# Patient Record
Sex: FEMALE | Race: OTHER
[De-identification: names, ages, dates, MRNs, and addresses within clinical notes are randomized per-mention and may not be internally consistent; named-entity substitution may affect disease eponyms.]

---

## 2017-02-06 ENCOUNTER — HOSPITAL ENCOUNTER (EMERGENCY)
Dept: HOSPITAL 62 - ER | Age: 22
Discharge: HOME | End: 2017-02-06
Payer: MEDICAID

## 2017-02-06 VITALS — DIASTOLIC BLOOD PRESSURE: 60 MMHG | SYSTOLIC BLOOD PRESSURE: 110 MMHG

## 2017-02-06 DIAGNOSIS — R19.7: ICD-10-CM

## 2017-02-06 DIAGNOSIS — R10.9: ICD-10-CM

## 2017-02-06 DIAGNOSIS — R11.10: Primary | ICD-10-CM

## 2017-02-06 DIAGNOSIS — R50.9: ICD-10-CM

## 2017-02-06 LAB
ALBUMIN SERPL-MCNC: 4.1 G/DL (ref 3.5–5)
ALP SERPL-CCNC: 46 U/L (ref 38–126)
ALT SERPL-CCNC: 21 U/L (ref 9–52)
ANION GAP SERPL CALC-SCNC: 11 MMOL/L (ref 5–19)
APPEARANCE UR: (no result)
AST SERPL-CCNC: 17 U/L (ref 14–36)
BASOPHILS # BLD AUTO: 0 10^3/UL (ref 0–0.2)
BASOPHILS NFR BLD AUTO: 0.2 % (ref 0–2)
BILIRUB DIRECT SERPL-MCNC: 0 MG/DL (ref 0–0.3)
BILIRUB SERPL-MCNC: 1.1 MG/DL (ref 0.2–1.3)
BILIRUB UR QL STRIP: NEGATIVE
BUN SERPL-MCNC: 10 MG/DL (ref 7–20)
CALCIUM: 9.2 MG/DL (ref 8.4–10.2)
CHLORIDE SERPL-SCNC: 105 MMOL/L (ref 98–107)
CO2 SERPL-SCNC: 27 MMOL/L (ref 22–30)
CREAT SERPL-MCNC: 0.56 MG/DL (ref 0.52–1.25)
EOSINOPHIL # BLD AUTO: 0.2 10^3/UL (ref 0–0.6)
EOSINOPHIL NFR BLD AUTO: 1.2 % (ref 0–6)
ERYTHROCYTE [DISTWIDTH] IN BLOOD BY AUTOMATED COUNT: 13 % (ref 11.5–14)
GLUCOSE SERPL-MCNC: 87 MG/DL (ref 75–110)
GLUCOSE UR STRIP-MCNC: NEGATIVE MG/DL
HCT VFR BLD CALC: 40.8 % (ref 36–47)
HGB BLD-MCNC: 13.9 G/DL (ref 12–15.5)
HGB HCT DIFFERENCE: 0.9
KETONES UR STRIP-MCNC: NEGATIVE MG/DL
LIPASE SERPL-CCNC: 128.8 U/L (ref 23–300)
LYMPHOCYTES # BLD AUTO: 1.2 10^3/UL (ref 0.5–4.7)
LYMPHOCYTES NFR BLD AUTO: 8.2 % (ref 13–45)
MCH RBC QN AUTO: 30.5 PG (ref 27–33.4)
MCHC RBC AUTO-ENTMCNC: 34.1 G/DL (ref 32–36)
MCV RBC AUTO: 89 FL (ref 80–97)
MONOCYTES # BLD AUTO: 1 10^3/UL (ref 0.1–1.4)
MONOCYTES NFR BLD AUTO: 6.8 % (ref 3–13)
NEUTROPHILS # BLD AUTO: 12.4 10^3/UL (ref 1.7–8.2)
NEUTS SEG NFR BLD AUTO: 83.6 % (ref 42–78)
NITRITE UR QL STRIP: NEGATIVE
PH UR STRIP: 6 [PH] (ref 5–9)
POTASSIUM SERPL-SCNC: 3.9 MMOL/L (ref 3.6–5)
PROT SERPL-MCNC: 7.3 G/DL (ref 6.3–8.2)
PROT UR STRIP-MCNC: 30 MG/DL
RBC # BLD AUTO: 4.57 10^6/UL (ref 3.72–5.28)
SODIUM SERPL-SCNC: 142.5 MMOL/L (ref 137–145)
SP GR UR STRIP: 1.03
UROBILINOGEN UR-MCNC: NEGATIVE MG/DL (ref ?–2)
WBC # BLD AUTO: 14.8 10^3/UL (ref 4–10.5)

## 2017-02-06 PROCEDURE — 83690 ASSAY OF LIPASE: CPT

## 2017-02-06 PROCEDURE — 81001 URINALYSIS AUTO W/SCOPE: CPT

## 2017-02-06 PROCEDURE — 85025 COMPLETE CBC W/AUTO DIFF WBC: CPT

## 2017-02-06 PROCEDURE — 36415 COLL VENOUS BLD VENIPUNCTURE: CPT

## 2017-02-06 PROCEDURE — S0119 ONDANSETRON 4 MG: HCPCS

## 2017-02-06 PROCEDURE — 99283 EMERGENCY DEPT VISIT LOW MDM: CPT

## 2017-02-06 PROCEDURE — 84703 CHORIONIC GONADOTROPIN ASSAY: CPT

## 2017-02-06 PROCEDURE — 80053 COMPREHEN METABOLIC PANEL: CPT

## 2017-02-06 NOTE — ER DOCUMENT REPORT
ED GI/





- General


Chief Complaint: Abdominal Pain


Stated Complaint: STOMACH PAIN


Mode of Arrival: Ambulatory


Information source: Patient


Notes: 


21-year-old female with past medical history of cholecystectomy in July 2015 

secondary to gallstones who presents today with the onset around 4 days ago of 

some intermittent left upper quadrant "sharp" abdominal pain relieved with 

vomiting.  She also states 4-5 bouts of nonbloody diarrhea daily.  She denies 

any recent travel or antibiotics.  She does state a temperature of around 102 

at maximum over the last 4 days.  She denies any fever the last 24 hours.  She 

denies any dysuria or flank pain.





She denies any radiation, aggravating relieving factors and she denies any 

other abdominal surgery in the past.  She denies any and all lower abdominal 

pain.  She denies missing any menstrual periods.


TRAVEL OUTSIDE OF THE [place] IN LAST 30 DAYS: No





- HPI


Patient complains to provider of: Abdominal pain


Onset: Other - See above


Timing/Duration: Gone


Quality of pain: Other - See above


Severity at maximum: Mild


Severity in ED: None


Pain Level: 2


Location: Other - See above


Vaginal bleeding (Compared to normal period): Spotting


Menstrual period history: denies: Abnormal


Sexual history: Active


Associated symptoms: Other - See above


Exacerbated by: Denies


Relieved by: Denies


Similar symptoms previously: Yes





- Related Data


Allergies/Adverse Reactions: 


 





No Known Allergies Allergy (Verified 02/06/17 10:06)


 











Past Medical History





- General


Information source: Patient





- Social History


Smoking Status: Never Smoker


Chew tobacco use (# tins/day): No


Frequency of alcohol use: Social


Drug Abuse: None


Family History: Reviewed & Not Pertinent


Patient has suicidal ideation: No


Patient has homicidal ideation: No


Renal/ Medical History: Denies: Hx Peritoneal Dialysis





- Immunizations


Hx Diphtheria, Pertussis, Tetanus Vaccination: Yes





Review of Systems





- Review of Systems


Constitutional: Fever


EENT: denies: Eye discharge, Nose discharge


Respiratory: denies: Short of breath


Gastrointestinal: Vomiting


Genitourinary: denies: Dysuria


Musculoskeletal: denies: Leg swelling


Skin: Other - no hives.  denies: Rash


Neurological/Psychological: Other - no slurred speech


-: Yes All other systems reviewed and negative





Physical Exam





- Vital signs


Vitals: 





 











Temp Pulse Resp BP Pulse Ox


 


 98.0 F   83   16   112/62   98 


 


 02/06/17 09:56  02/06/17 09:56  02/06/17 09:56  02/06/17 09:56  02/06/17 09:56











Notes: 


Reviewed vital signs and nursing note as charted by RN.





CONSTITUTIONAL: Alert and oriented and responds appropriately to questions. Well

-appearing; well-nourished





HEAD: Normocephalic; atraumatic





EYES: Sclerae non-icteric





CARD: Regular rate and rhythm; no murmurs, no clicks, no rubs, no gallops; 

symmetric distal pulses





RESP: Normal chest excursion without splinting or tachypnea; breath sounds 

clear and equal bilaterally; no wheezes, no rhonchi, no rales





ABD/GI: Normal bowel sounds; non-distended; soft, non-tender to deep palpation 

of all 4 quadrants of the abdomen currently.  Patient denies any pain at this 

time.





BACK: The back appears normal and is non-tender to palpation, there is no CVA 

tenderness





EXT: Normal ROM in all joints; non-tender to palpation; no cyanosis, no 

effusions, no edema





SKIN: Normal color for age and race; warm; dry; good turgor; capillary refill < 

2 seconds; no acute lesions noted





NEURO: Moves all extremities equally; Motor and sensory function intact





PSYCH: The patient's mood and manner are appropriate. Grooming and personal 

hygiene are appropriate.





Course





- Re-evaluation


Re-evalutation: 


Given the history and physical examination, I will order basic labs, abdominal 

labs, urinalysis, and pregnancy test.  Patient denies any and all pain at this 

time.  There is no tenderness to palpation of the abdomen at this time.  

Patient is sitting up smiling in no acute distress.  She is currently started 

her menstrual period around 3 days ago.





02/06/17 11:22


Labs as recorded.  Minimally elevated white count.  Possible urinary tract 

infection mixed with blood.  





Urine culture has been sent.  Normal liver panel and lipase.  No lower 

abdominal tenderness objectively or subjectively.  I do not believe a pelvic 

examination is necessary at this time.  I will start the patient on a short 

course of ciprofloxacin with strict return precautions and urine culture 

pending.  Patient will be discharged with Zofran after a successful by mouth 

challenge with strict return precautions.





- Vital Signs


Vital signs: 





 











Temp Pulse Resp BP Pulse Ox


 


 98.0 F   83   16   112/62   98 


 


 02/06/17 09:56  02/06/17 09:56  02/06/17 09:56  02/06/17 09:56  02/06/17 09:56














- Laboratory


Result Diagrams: 


 02/06/17 10:30





 02/06/17 10:30


Laboratory results interpreted by me: 





 











  02/06/17 02/06/17





  10:30 10:30


 


WBC  14.8 H 


 


Seg Neutrophils %  83.6 H 


 


Lymphocytes %  8.2 L 


 


Absolute Neutrophils  12.4 H 


 


Urine Protein   30 H


 


Urine Blood   LARGE H


 


Ur Leukocyte Esterase   MODERATE H














Discharge





- Discharge


Clinical Impression: 


 Vomiting and diarrhea





UTI (urinary tract infection)


Qualifiers:


 Urinary tract infection type: site unspecified Hematuria presence: with 

hematuria Qualified Code(s): N39.0 - Urinary tract infection, site not specified

; R31.9 - Hematuria, unspecified





Condition: Good


Disposition: HOME, SELF-CARE


Additional Instructions: 


Come back immediately for any return of pain, change in quality or location of 

pain, persistent vomiting or diarrhea, or any other acute problems.  Please 

take the antibiotics as prescribed, follow-up with the urine culture, and follow

-up with your primary care physician.


Prescriptions: 


Ciprofloxacin HCl [Cipro 500 mg Tablet] 500 mg PO BID #10 tablet


Ondansetron HCl [Zofran 4 mg Tablet] 1 - 2 tab PO Q4H PRN #10 tablet


 PRN Reason:

## 2017-02-06 NOTE — ER DOCUMENT REPORT
ED Medical Screen (RME)





- General


Stated Complaint: STOMACH PAIN


Mode of Arrival: Ambulatory


Information source: Patient


Notes: 


Patient complains of left upper quadrant abdominal pain off and on for the past 

4 days with nausea and vomiting.  She does report some diarrhea.  Patient does 

report fever of 102.





hx: Cholecystectomy





I have greeted and performed a rapid initial assessment of this patient.  A 

comprehensive ED assessment and evaluation of the patient, analysis of test 

results and completion of the medical decision making process will be conducted 

by additional ED providers.


TRAVEL OUTSIDE OF THE [place] IN LAST 30 DAYS: No





- Related Data


Allergies/Adverse Reactions: 


 





No Known Allergies Allergy (Verified 02/06/17 10:06)


 











Physical Exam





- Vital signs


Vitals: 





 











Temp Pulse Resp BP Pulse Ox


 


 98.0 F   83   16   112/62   98 


 


 02/06/17 09:56  02/06/17 09:56  02/06/17 09:56  02/06/17 09:56  02/06/17 09:56














- Abdominal


Tenderness: Tender - Left upper quadrant





Course





- Vital Signs


Vital signs: 





 











Temp Pulse Resp BP Pulse Ox


 


 98.0 F   83   16   112/62   98 


 


 02/06/17 09:56  02/06/17 09:56  02/06/17 09:56  02/06/17 09:56  02/06/17 09:56

## 2017-09-28 ENCOUNTER — HOSPITAL ENCOUNTER (EMERGENCY)
Dept: HOSPITAL 62 - ER | Age: 22
Discharge: HOME | End: 2017-09-28
Payer: MEDICAID

## 2017-09-28 VITALS — DIASTOLIC BLOOD PRESSURE: 80 MMHG | SYSTOLIC BLOOD PRESSURE: 126 MMHG

## 2017-09-28 DIAGNOSIS — R10.9: ICD-10-CM

## 2017-09-28 DIAGNOSIS — R10.13: Primary | ICD-10-CM

## 2017-09-28 DIAGNOSIS — R42: ICD-10-CM

## 2017-09-28 LAB
ALBUMIN SERPL-MCNC: 4.4 G/DL (ref 3.5–5)
ALP SERPL-CCNC: 43 U/L (ref 38–126)
ALT SERPL-CCNC: 24 U/L (ref 9–52)
ANION GAP SERPL CALC-SCNC: 11 MMOL/L (ref 5–19)
APPEARANCE UR: CLEAR
AST SERPL-CCNC: 21 U/L (ref 14–36)
BASOPHILS # BLD AUTO: 0 10^3/UL (ref 0–0.2)
BASOPHILS NFR BLD AUTO: 0.3 % (ref 0–2)
BILIRUB DIRECT SERPL-MCNC: 0.2 MG/DL (ref 0–0.4)
BILIRUB SERPL-MCNC: 0.7 MG/DL (ref 0.2–1.3)
BILIRUB UR QL STRIP: NEGATIVE
BUN SERPL-MCNC: 7 MG/DL (ref 7–20)
CALCIUM: 9.9 MG/DL (ref 8.4–10.2)
CHLORIDE SERPL-SCNC: 103 MMOL/L (ref 98–107)
CO2 SERPL-SCNC: 28 MMOL/L (ref 22–30)
CREAT SERPL-MCNC: 0.49 MG/DL (ref 0.52–1.25)
EOSINOPHIL # BLD AUTO: 0.2 10^3/UL (ref 0–0.6)
EOSINOPHIL NFR BLD AUTO: 2 % (ref 0–6)
ERYTHROCYTE [DISTWIDTH] IN BLOOD BY AUTOMATED COUNT: 13.1 % (ref 11.5–14)
GLUCOSE SERPL-MCNC: 93 MG/DL (ref 75–110)
GLUCOSE UR STRIP-MCNC: NEGATIVE MG/DL
HCT VFR BLD CALC: 40.8 % (ref 36–47)
HGB BLD-MCNC: 14 G/DL (ref 12–15.5)
HGB HCT DIFFERENCE: 1.2
KETONES UR STRIP-MCNC: NEGATIVE MG/DL
LIPASE SERPL-CCNC: 164.4 U/L (ref 23–300)
LYMPHOCYTES # BLD AUTO: 2.3 10^3/UL (ref 0.5–4.7)
LYMPHOCYTES NFR BLD AUTO: 18.3 % (ref 13–45)
MCH RBC QN AUTO: 30.7 PG (ref 27–33.4)
MCHC RBC AUTO-ENTMCNC: 34.2 G/DL (ref 32–36)
MCV RBC AUTO: 90 FL (ref 80–97)
MONOCYTES # BLD AUTO: 0.7 10^3/UL (ref 0.1–1.4)
MONOCYTES NFR BLD AUTO: 5.6 % (ref 3–13)
NEUTROPHILS # BLD AUTO: 9.3 10^3/UL (ref 1.7–8.2)
NEUTS SEG NFR BLD AUTO: 73.8 % (ref 42–78)
NITRITE UR QL STRIP: NEGATIVE
PH UR STRIP: 5 [PH] (ref 5–9)
POTASSIUM SERPL-SCNC: 4.1 MMOL/L (ref 3.6–5)
PROT SERPL-MCNC: 7.3 G/DL (ref 6.3–8.2)
PROT UR STRIP-MCNC: NEGATIVE MG/DL
RBC # BLD AUTO: 4.55 10^6/UL (ref 3.72–5.28)
SODIUM SERPL-SCNC: 141.5 MMOL/L (ref 137–145)
SP GR UR STRIP: 1.02
UROBILINOGEN UR-MCNC: 2 MG/DL (ref ?–2)
WBC # BLD AUTO: 12.7 10^3/UL (ref 4–10.5)

## 2017-09-28 PROCEDURE — 80053 COMPREHEN METABOLIC PANEL: CPT

## 2017-09-28 PROCEDURE — 81001 URINALYSIS AUTO W/SCOPE: CPT

## 2017-09-28 PROCEDURE — 82272 OCCULT BLD FECES 1-3 TESTS: CPT

## 2017-09-28 PROCEDURE — 71020: CPT

## 2017-09-28 PROCEDURE — 85025 COMPLETE CBC W/AUTO DIFF WBC: CPT

## 2017-09-28 PROCEDURE — 99284 EMERGENCY DEPT VISIT MOD MDM: CPT

## 2017-09-28 PROCEDURE — 81025 URINE PREGNANCY TEST: CPT

## 2017-09-28 PROCEDURE — 36415 COLL VENOUS BLD VENIPUNCTURE: CPT

## 2017-09-28 PROCEDURE — 83690 ASSAY OF LIPASE: CPT

## 2017-09-28 NOTE — ER DOCUMENT REPORT
ED Medical Screen (RME)





- General


Chief Complaint: Abdominal Pain


Stated Complaint: ABDOMINAL PAIN, DIZZY


Time Seen by Provider: 09/28/17 17:46


Notes: 





Patient states that since yesterday she has had intermittent left upper 

quadrant abdominal pain.  She states previously when she had this pain she had 

a "ruptured gallbladder".  That was approximately 2 years ago.  She states she 

has been able to eat normally but has had some nausea.  She is also had 

diarrhea.  She denies any vaginal symptoms.


TRAVEL OUTSIDE OF THE [place] IN LAST 30 DAYS: No





- Related Data


Allergies/Adverse Reactions: 


 





No Known Allergies Allergy (Verified 09/28/17 16:46)


 











Past Medical History





- Social History


Chew tobacco use (# tins/day): No


Frequency of alcohol use: None


Drug Abuse: None


Renal/ Medical History: Denies: Hx Peritoneal Dialysis


Past Surgical History: Reports: Hx Cholecystectomy





- Immunizations


Hx Diphtheria, Pertussis, Tetanus Vaccination: Yes





Physical Exam





- Vital signs


Vitals: 





 











Temp Pulse Resp BP Pulse Ox


 


 98.7 F   72   14   126/80 H  100 


 


 09/28/17 16:46  09/28/17 16:46  09/28/17 16:46  09/28/17 16:46  09/28/17 16:46














Course





- Vital Signs


Vital signs: 





 











Temp Pulse Resp BP Pulse Ox


 


 98.7 F   72   14   126/80 H  100 


 


 09/28/17 16:46  09/28/17 16:46  09/28/17 16:46  09/28/17 16:46  09/28/17 16:46

## 2017-09-28 NOTE — ER DOCUMENT REPORT
ED General





- General


Chief Complaint: Abdominal Pain


Stated Complaint: ABDOMINAL PAIN, DIZZY


Time Seen by Provider: 09/28/17 17:46


TRAVEL OUTSIDE OF THE [place] IN LAST 30 DAYS: No





- HPI


Notes: 





Patient is a 22-year-old female who presents the ED complaining of epigastric 

pain 1 day.  Patient states she does have an occasional feeling of nausea 

without any vomiting.  Patient states that on occasion she will also have loose 

stool without any hematochezia or melena.  Patient states that the pain does 

not radiate.  Patient states that she had pain similar to this in the past, and 

was found to have a rupturing gallbladder which she has since had surgically 

removed.  Patient states that food intake does improve her discomfort.  She is 

not aware of anything that worsens it.  The pain is intermittent.  Pt has been 

having normal urinations.  She has not been taking any over-the-counter meds 

for her symptoms.  Denies any drug allergies.  Denies any other significant 

past medical history.  Denies any smoking or drug use.  Denies any headache, 

fever, URI, sore throat, chest pain, palpitations, syncope, cough, shortness of 

breath, wheeze, dyspnea, vomiting/constipation, urinary retention, dysuria, 

hematuria, back pain, vaginal discharge/odor/bleeding, or rash.





- Related Data


Allergies/Adverse Reactions: 


 





No Known Allergies Allergy (Verified 09/28/17 16:46)


 











Past Medical History





- Social History


Smoking Status: Never Smoker


Chew tobacco use (# tins/day): No


Frequency of alcohol use: None


Drug Abuse: None


Family History: Reviewed & Not Pertinent


Renal/ Medical History: Denies: Hx Peritoneal Dialysis


Past Surgical History: Reports: Hx Cholecystectomy





- Immunizations


Hx Diphtheria, Pertussis, Tetanus Vaccination: Yes





Review of Systems





- Review of Systems


Notes: 





  REVIEW OF SYSTEMS:


CONSTITUTIONAL :  Denies fever,  chills, or sweats.  Denies recent illness.


EENT:   Denies eye, ear, throat, or mouth pain or symptoms.  Denies nasal or 

sinus congestion or discharge.  Denies throat, tongue, or mouth swelling or 

difficulty swallowing.


CARDIOVASCULAR:  Denies chest pain.  Denies palpitations or racing or irregular 

heart beat.  Denies ankle edema.


RESPIRATORY:  Denies cough, cold, or chest congestion.  Denies shortness of 

breath, difficulty breathing, or wheezing.


GASTROINTESTINAL: see hpi


GENITOURINARY:  Denies difficulty urinating, painful urination, burning, 

frequency, blood in urine, or discharge.


FEMALE  GENITOURINARY:  Denies vaginal bleeding, heavy or abnormal periods, 

irregular periods.  Denies vaginal discharge or odor. 


MUSCULOSKELETAL:  Denies back or neck pain or stiffness.  Denies joint pain or 

swelling.


SKIN:   Denies rash, lesions or sores.


NEUROLOGICAL:  Denies confusion or altered mental status.  Denies passing out 

or loss of consciousness.  Denies dizziness or lightheadedness.  Denies 

headache.  Denies weakness or paralysis or loss of use of either side.  Denies 

problems with gait or speech.  Denies sensory loss, numbness, or tingling.  





ALL OTHER SYSTEMS REVIEWED AND NEGATIVE.








Dictation was performed using Dragon voice recognition software





Physical Exam





- Vital signs


Vitals: 


 











Temp Pulse Resp BP Pulse Ox


 


 98.7 F   72   14   126/80 H  100 


 


 09/28/17 16:46  09/28/17 16:46  09/28/17 16:46  09/28/17 16:46  09/28/17 16:46











Notes: 





PHYSICAL EXAMINATION:





GENERAL: Well-appearing, well-nourished and in no acute distress.





HEAD: Atraumatic, normocephalic.





EYES: Pupils equal round and reactive to light, extraocular movements intact, 

sclera anicteric, conjunctiva are normal.





ENT: EAC clear b/l.  TM's intact b/l without erythema, fluid, or perforation.  

Nares patent and without discharge.  oropharynx clear without exudates.  No 

tonsilar hypertrophy or erythema.  Moist mucous membranes.  No sinus tenderness.





NECK: Normal range of motion, supple without lymphadenopathy





LUNGS: Breath sounds clear to auscultation bilaterally and equal.  No wheezes 

rales or rhonchi.





HEART: Regular rate and rhythm without murmurs, rubs, gallops.





ABDOMEN: Soft, nondistended abdomen.  No masses appreciated.  Normal bowel 

sounds present.  No CVA tenderness bilaterally.  + tenderness to the 

epigastrum.  Psoas/rosving negative.  + mild guarding to the epigastrum.





Rectal:  Guiac obtained.  No sandeep blood. accompanied by female PCT.





Musculoskeletal: FROM to passive/active. Strength 5+/5. 





Extremities:  No cyanosis, clubbing, or edema b/l.  Peripheral pulses 2+.  

Capillary refill less than 3 seconds.





NEUROLOGICAL: Cranial nerves grossly intact.  Normal speech, normal gait.  

Normal sensory, motor exams 





PSYCH: Normal mood, normal affect.





SKIN: Warm, Dry, normal turgor, no rashes or lesions noted.





Course





- Re-evaluation


Re-evalutation: 





09/28/17 21:10


Patient is an afebrile, well-hydrated, 22-year-old female who presents the ED 

with epigastric pain, ?ulcer.   vitals are stable.  PE otherwise unremarkable.  

CBC, CMP, Lipase, UA, urine pregn unremarkable.  CXR unremarkable.  Guiac 

negative.  Reviewed with Dr. Clay.  No imaging warranted at this time.  GI 

cocktail given which did help dec her pain.  Low suspicion/risk for acute 

appendicitis, bowel obstruction, acute cholecystitis, acute cholangitis, 

perforated diverticulitis, incarcerated hernia, pancreatitis, perforated ulcer, 

peritonitis, sepsis, pelvic inflammatory disease, ectopic pregnancy, tubo-

ovarian abscess, ovarian torsion, or other systemic emergent condition at this 

time.  Patient is aware that her condition can change from initial presentation 

and she needs to monitor symptoms closely and seek medical attention if any 

acute changes.  I will send her home with Prilosec and carafate to take as 

directed.  Conservative measures otherwise for symptoms.  Recheck with GI in 3-

5 days.  Recheck with your PCM in 3-5 days.  Return to the ED with any worsening

/concerning symptoms otherwise as reviewed in discharge.  Patient is in 

agreement.








- Vital Signs


Vital signs: 


 











Temp Pulse Resp BP Pulse Ox


 


 98.7 F   72   14   126/80 H  100 


 


 09/28/17 16:46  09/28/17 16:46  09/28/17 16:46  09/28/17 16:46  09/28/17 16:46














- Laboratory


Result Diagrams: 


 09/28/17 18:00





 09/28/17 18:00


Laboratory results interpreted by me: 


 











  09/28/17 09/28/17 09/28/17





  18:00 18:00 18:00


 


WBC  12.7 H  


 


Absolute Neutrophils  9.3 H  


 


Creatinine   0.49 L 


 


Urine Urobilinogen    2.0 H














Discharge





- Discharge


Clinical Impression: 


 Epigastric pain





Condition: Stable


Disposition: HOME, SELF-CARE


Instructions:  Abdominal Pain ([facility]), Family Physicians / Practices, Low-Fat 

Diet ([facility]), Prilosec (Acid Pump Inhibitor) ([facility]), Ulcer ([facility]), Follow-Up Care (

[facility])


Additional Instructions: 


Maintain adequate fluid and food intake


Take medications as directed 


Tylenol if needed


Monitor for any changes in your symptoms


Recheck with your PCM in 2-3 days


Schedule an appointment with gastroenterology


Return to the ED with any worsening symptoms and/or development of fever, 

headache, chest pain, palpitations, syncope, shortness of breath, trouble 

breathing, abdominal pain, n/v/d, blood in stool/urine, loss of control of bowel

/bladder, back pain, or other worsening symptoms that are concerning to you.


Prescriptions: 


Omeprazole 20 mg PO DAILY #30 tablet.


Sucralfate [Carafate] 1 gm PO QID PRN #420 ml


 PRN Reason: 


Forms:  Elevated Blood Pressure


Referrals: 


[facility] [Provider Group] - Follow up as needed


[facility] [Provider Group] - Follow up as needed


[facility] MD [ACTIVE STAFF] - Follow up as needed

## 2017-09-28 NOTE — RADIOLOGY REPORT (SQ)
EXAM DESCRIPTION:  CHEST PA/LAT



COMPLETED DATE/TIME:  9/28/2017 7:26 pm



REASON FOR STUDY:  epigastric pain



COMPARISON:  None.



EXAM PARAMETERS:  NUMBER OF VIEWS: two views

TECHNIQUE: Digital Frontal and Lateral radiographic views of the chest acquired.

RADIATION DOSE: NA

LIMITATIONS: none



FINDINGS:  LUNGS AND PLEURA: No opacities, masses or pneumothorax. No pleural effusion.

MEDIASTINUM AND HILAR STRUCTURES: No masses or contour abnormalities.

HEART AND VASCULAR STRUCTURES: Heart normal size.  No evidence for failure.

BONES: No acute findings.

HARDWARE: None in the chest.

OTHER: No other significant finding.



IMPRESSION:  NO SIGNIFICANT RADIOGRAPHIC FINDING IN THE CHEST.



TECHNICAL DOCUMENTATION:  JOB ID:  [id number]

 2011 [employer]- All Rights Reserved

## 2018-08-01 ENCOUNTER — HOSPITAL ENCOUNTER (EMERGENCY)
Dept: HOSPITAL 62 - ER | Age: 23
Discharge: HOME | End: 2018-08-01
Payer: MEDICAID

## 2018-08-01 VITALS — SYSTOLIC BLOOD PRESSURE: 118 MMHG | DIASTOLIC BLOOD PRESSURE: 63 MMHG

## 2018-08-01 DIAGNOSIS — Z3A.09: ICD-10-CM

## 2018-08-01 DIAGNOSIS — J40: ICD-10-CM

## 2018-08-01 DIAGNOSIS — R05: ICD-10-CM

## 2018-08-01 DIAGNOSIS — O26.851: ICD-10-CM

## 2018-08-01 DIAGNOSIS — O99.511: Primary | ICD-10-CM

## 2018-08-01 DIAGNOSIS — R10.2: ICD-10-CM

## 2018-08-01 DIAGNOSIS — O26.891: ICD-10-CM

## 2018-08-01 LAB
APPEARANCE UR: (no result)
APTT PPP: YELLOW S
BACTERIA (WET MOUNT): (no result)
BILIRUB UR QL STRIP: NEGATIVE
CHLAM PCR: NOT DETECTED
EPITHELIALS (WET MOUNT): (no result)
GLUCOSE UR STRIP-MCNC: NEGATIVE MG/DL
GON PCR: NOT DETECTED
KETONES UR STRIP-MCNC: NEGATIVE MG/DL
NITRITE UR QL STRIP: NEGATIVE
PH UR STRIP: 6 [PH] (ref 5–9)
PROT UR STRIP-MCNC: NEGATIVE MG/DL
RBCS (WET MOUNT): (no result)
SP GR UR STRIP: 1.02
T.VAGINALIS (WET MOUNT): (no result)
UROBILINOGEN UR-MCNC: 2 MG/DL (ref ?–2)
WBCS (WET MOUNT): (no result)
YEAST (WET MOUNT): (no result)

## 2018-08-01 PROCEDURE — 81001 URINALYSIS AUTO W/SCOPE: CPT

## 2018-08-01 PROCEDURE — 36415 COLL VENOUS BLD VENIPUNCTURE: CPT

## 2018-08-01 PROCEDURE — 71046 X-RAY EXAM CHEST 2 VIEWS: CPT

## 2018-08-01 PROCEDURE — 87591 N.GONORRHOEAE DNA AMP PROB: CPT

## 2018-08-01 PROCEDURE — 86900 BLOOD TYPING SEROLOGIC ABO: CPT

## 2018-08-01 PROCEDURE — 87210 SMEAR WET MOUNT SALINE/INK: CPT

## 2018-08-01 PROCEDURE — 76801 OB US < 14 WKS SINGLE FETUS: CPT

## 2018-08-01 PROCEDURE — 87491 CHLMYD TRACH DNA AMP PROBE: CPT

## 2018-08-01 PROCEDURE — 99284 EMERGENCY DEPT VISIT MOD MDM: CPT

## 2018-08-01 PROCEDURE — 86901 BLOOD TYPING SEROLOGIC RH(D): CPT

## 2018-08-01 NOTE — ER DOCUMENT REPORT
ED Medical Screen (RME)





- General


Chief Complaint: Cough


Stated Complaint: COUGH


Time Seen by Provider: 08/01/18 12:08


TRAVEL OUTSIDE OF THE [place] IN LAST 30 DAYS: No





- HPI


Patient complains to provider of: Cough and shortness of breath 3 weeks





- Related Data


Allergies/Adverse Reactions: 


 





No Known Allergies Allergy (Verified 09/28/17 16:46)


 











Past Medical History


Renal/ Medical History: Denies: Hx Peritoneal Dialysis


Past Surgical History: Reports: Hx Cholecystectomy





- Immunizations


Hx Diphtheria, Pertussis, Tetanus Vaccination: Yes





Review of Systems





- Review of Systems


Respiratory: Cough, Sputum


Female Genitourinary: See HPI - Patient with single episode of spotting





Physical Exam





- Vital signs


Vitals: 





 











Temp Pulse Resp BP Pulse Ox


 


 99.1 F   88   14   118/63   98 


 


 08/01/18 11:25  08/01/18 11:25  08/01/18 11:25  08/01/18 11:25  08/01/18 11:25














Course





- Re-evaluation


Re-evalutation: 





08/01/18 12:12


This 23-year-old female is 9 weeks pregnant currently receiving prenatal care 

has had 3 weeks of persistent cough with some productive sputum and 1 day of 

fevers.  She presented without any obvious distress and looks overall well.  

Given her symptoms we will plan for evaluation with two-view chest x-ray and 

appropriate abdominal shielding.


Spoke with the patient about the risks and benefits of chest x-ray and the dose 

of radiation for the fetus.


Patient will also require a transabdominal ultrasound for fetal well-being as 

she did have a single episode of spotting which she attributed to persistent 

coughing.


Patient to have chest x-ray as well as urinalysis.  Will move through the PIT 

for further evaluation.





- Vital Signs


Vital signs: 





 











Temp Pulse Resp BP Pulse Ox


 


 99.1 F   88   14   118/63   98 


 


 08/01/18 11:25  08/01/18 11:25  08/01/18 11:25  08/01/18 11:25  08/01/18 11:25

## 2018-08-01 NOTE — ER DOCUMENT REPORT
ED Respiratory Problem





- General


Chief Complaint: Cough


Stated Complaint: COUGH


Time Seen by Provider: 08/01/18 12:08


TRAVEL OUTSIDE OF THE [place] IN LAST 30 DAYS: No





- Related Data


Allergies/Adverse Reactions: 


 





No Known Allergies Allergy (Verified 09/28/17 16:46)


 











Past Medical History





- Social History


Smoking Status: Never Smoker


Family History: Reviewed & Not Pertinent


Patient has suicidal ideation: No


Patient has homicidal ideation: No


Renal/ Medical History: Denies: Hx Peritoneal Dialysis


Past Surgical History: Reports: Hx Cholecystectomy





- Immunizations


Hx Diphtheria, Pertussis, Tetanus Vaccination: Yes





Physical Exam





- Vital signs


Vitals: 





 











Temp Pulse Resp BP Pulse Ox


 


 99.1 F   88   14   118/63   98 


 


 08/01/18 11:25  08/01/18 11:25  08/01/18 11:25  08/01/18 11:25  08/01/18 11:25














Course





- Vital Signs


Vital signs: 





 











Temp Pulse Resp BP Pulse Ox


 


 99.1 F   88   14   118/63   98 


 


 08/01/18 11:25  08/01/18 11:25  08/01/18 11:25  08/01/18 11:25  08/01/18 11:25

## 2018-08-01 NOTE — RADIOLOGY REPORT (SQ)
EXAM DESCRIPTION:  CHEST 2 VIEWS



COMPLETED DATE/TIME:  8/1/2018 12:26 pm



REASON FOR STUDY:  persistent cough and fever



COMPARISON:  9/28/2017.



EXAM PARAMETERS:  NUMBER OF VIEWS: two views

TECHNIQUE: Digital Frontal and Lateral radiographic views of the chest acquired.

RADIATION DOSE: NA

LIMITATIONS: none



FINDINGS:  LUNGS AND PLEURA: No opacities, masses or pneumothorax. No pleural effusion.

MEDIASTINUM AND HILAR STRUCTURES: No masses or contour abnormalities.

HEART AND VASCULAR STRUCTURES: Heart normal size.  No evidence for failure.

BONES: No acute findings.

HARDWARE: None in the chest.

OTHER: No other significant finding.



IMPRESSION:  NO ACUTE RADIOGRAPHIC FINDING IN THE CHEST.



TECHNICAL DOCUMENTATION:  JOB ID:  [id number]

 2011 [employer]- All Rights Reserved



Reading location - IP/workstation name: [email or web address]

## 2018-08-01 NOTE — ER DOCUMENT REPORT
ED General





- General


Chief Complaint: Cough


Stated Complaint: COUGH


Time Seen by Provider: 18 12:08


Mode of Arrival: Ambulatory


Information source: Patient


Notes: 





23-year-old non-smoker pregnant female complaining of a cough with white mucus 

production since .  She also is having some pelvic cramping and spotted 

last night.  No dysuria frequency or urgency.  No flank pain.  No vaginal 

discharge or odor.  She had chlamydia in .  


TRAVEL OUTSIDE OF THE [place] IN LAST 30 DAYS: No





- Related Data


Allergies/Adverse Reactions: 


 





No Known Allergies Allergy (Verified 17 16:46)


 











Past Medical History





- General


Information source: Patient





- Social History


Smoking Status: Never Smoker


Frequency of alcohol use: None


Drug Abuse: None


Lives with: Family


Family History: Reviewed & Not Pertinent


Patient has suicidal ideation: No


Patient has homicidal ideation: No


Renal/ Medical History: Denies: Hx Peritoneal Dialysis


Past Surgical History: Reports: Hx Cholecystectomy





- Immunizations


Hx Diphtheria, Pertussis, Tetanus Vaccination: Yes





Review of Systems





- Review of Systems


Constitutional: No symptoms reported


EENT: See HPI


Cardiovascular: No symptoms reported


Respiratory: See HPI


Gastrointestinal: No symptoms reported


Genitourinary: No symptoms reported


Female Genitourinary: No symptoms reported


Musculoskeletal: No symptoms reported


Skin: No symptoms reported


Hematologic/Lymphatic: No symptoms reported


Neurological/Psychological: No symptoms reported





Physical Exam





- Vital signs


Vitals: 


 











Temp Pulse Resp BP Pulse Ox


 


 99.1 F   88   14   118/63   98 


 


 18 11:25  18 11:25  18 11:25  18 11:25  18 11:25











Interpretation: Normal





- General


General appearance: Appears well, Alert





- HEENT


Head: Normocephalic, Atraumatic


Eyes: Normal


Pupils: PERRL


Tympanic membrane: Normal


Mouth/Lips: Normal


Mucous membranes: Normal


Neck: Supple.  No: Lymphadenopathy





- Respiratory


Respiratory status: No respiratory distress


Chest status: Nontender, Other - Mucous cough


Breath sounds: Normal


Chest palpation: Normal





- Cardiovascular


Rhythm: Regular


Heart sounds: Normal auscultation


Murmur: No





- Abdominal


Inspection: Normal


Distension: No distension


Bowel sounds: Normal


Tenderness: Nontender.  No: Tender


Organomegaly: No organomegaly





- Back


Back: Normal, Nontender.  No: CVA tenderness





- Extremities


General upper extremity: Normal inspection, Nontender, Normal color, Normal ROM

, Normal temperature


General lower extremity: Normal inspection, Nontender, Normal color, Normal ROM

, Normal temperature, Normal weight bearing.  No: Eulalio's sign





- Neurological


Neuro grossly intact: Yes


Cognition: Normal


Orientation: AAOx4


Fowler Coma Scale Eye Opening: Spontaneous


Fowler Coma Scale Verbal: Oriented


Fowler Coma Scale Motor: Obeys Commands


Alexia Coma Scale Total: 15


Speech: Normal


Motor strength normal: LUE, RUE, LLE, RLE


Sensory: Normal





- Psychological


Associated symptoms: Normal affect, Normal mood





- Skin


Skin Temperature: Warm


Skin Moisture: Dry


Skin Color: Normal


Skin irregularity: negative: Rash





Course





- Re-evaluation


Re-evalutation: 





18 16:20


Chest x-ray is negative, viable 9 week 5 day IUP, wet prep possible bacterial 

vaginosis but she does not have a fishy odor itching or burning, GC and 

chlamydia is negative.  Urinalysis is negative.  She has a blood donor card 

that says she is a positive but I am drawing a RhoGam letting her leave just to 

be sure and will call her if she is Rh- because of the spotting yesterday.  I 

have going to treat her with a azithromycin because she has been coughing with 

bronchitis for a month.,  She does produce mucus when she coughs.





- Vital Signs


Vital signs: 


 











Temp Pulse Resp BP Pulse Ox


 


 99.1 F   88   14   118/63   98 


 


 18 11:25  18 11:25  18 11:25  18 11:25  18 11:25














- Laboratory


Laboratory results interpreted by me: 


 











  18





  13:00


 


Urine Urobilinogen  2.0 H














Discharge





- Discharge


Clinical Impression: 


 Viable 9 week 5 day IUP, Bronchitis





Condition: Good


Disposition: HOME, SELF-CARE


Instructions:  Bronchitis ([facility]), Azithromycin ([facility]), Acetaminophen


Additional Instructions: 


Copy of all the lab work and imaging results given to you


Return to the emergency room if there is any chest pain or shortness of breath.

  Azithromycin as an antibiotic since she had bronchitis for 1 month


Return to the emergency room for any increased pelvic pain cramping or bleeding.


Prescriptions: 


Azithromycin [Zithromax] 250 mg PO DAILY #6 tablet


Forms:  Return to Work


Referrals: 


SANDERS,KELLI, MD [Primary Care Provider] - Follow up as needed

## 2018-08-01 NOTE — RADIOLOGY REPORT (SQ)
EXAM DESCRIPTION:  U/S [id number] TRNABD 1GES W/ODOP



COMPLETED DATE/TIME:  8/1/2018 3:00 pm



REASON FOR STUDY:  spotting right pelvic pain



COMPARISON:  None.



TECHNIQUE:  Transabdominal static and realtime grayscale images acquired of the pelvis. Additional se
lected spectral and color Doppler images recorded. All images stored on PACs.

bHCG: Not available.

CLINICAL DATES:  5 week 2 day.



LIMITATIONS:  None.



FINDINGS:  FETUS: Living intrauterine pregnancy.

ULTRASOUND EGA: 9 week 5 day.

ULTRASOUND MICHEAL: 3/1/2019.

CRL:  2.9 cm.

FHR: 160  beats per minute.

SUBCHORIONIC BLEED: No.

SIZE OF BLEED: Not applicable.

UTERUS: No masses. No anomalies.

CERVICAL LENGTH: 3.1 cm.   Closed.

RIGHT ADNEXA: Normal ovary with normal vascular flow.

No adnexal free fluid.

No adnexal masses.

LEFT ADNEXA: Normal ovary with normal vascular flow.

No adnexal free fluid.

No adnexal masses.

FREE FLUID: None.

OTHER: No other significant finding.



IMPRESSION:  LIVING INTRAUTERINE PREGNANCY.

EGA 9 WEEK 5 DAY.

Trimester of pregnancy:  First - 0 to 13 weeks.



TECHNICAL DOCUMENTATION:  JOB ID:  [id number]

 2011 [employer]- All Rights Reserved                            rev-5/18



Reading location - IP/workstation name: [email or web address]

## 2019-01-04 ENCOUNTER — HOSPITAL ENCOUNTER (OUTPATIENT)
Dept: HOSPITAL 62 - LC | Age: 24
Discharge: HOME | End: 2019-01-04
Attending: OBSTETRICS & GYNECOLOGY
Payer: OTHER GOVERNMENT

## 2019-01-04 DIAGNOSIS — Z3A.32: ICD-10-CM

## 2019-01-04 DIAGNOSIS — O47.03: Primary | ICD-10-CM

## 2019-01-04 LAB
APPEARANCE UR: CLEAR
APTT PPP: (no result) S
BARBITURATES UR QL SCN: NEGATIVE
BILIRUB UR QL STRIP: NEGATIVE
GLUCOSE UR STRIP-MCNC: NEGATIVE MG/DL
KETONES UR STRIP-MCNC: NEGATIVE MG/DL
METHADONE UR QL SCN: NEGATIVE
NITRITE UR QL STRIP: NEGATIVE
PCP UR QL SCN: NEGATIVE
PH UR STRIP: 7 [PH]
PROT UR STRIP-MCNC: NEGATIVE MG/DL
SP GR UR STRIP: 1
URINE AMPHETAMINES SCREEN: NEGATIVE
URINE BENZODIAZEPINES SCREEN: NEGATIVE
URINE COCAINE SCREEN: NEGATIVE
URINE MARIJUANA (THC) SCREEN: NEGATIVE
UROBILINOGEN UR-MCNC: NEGATIVE MG/DL

## 2019-01-04 PROCEDURE — 59025 FETAL NON-STRESS TEST: CPT

## 2019-01-04 PROCEDURE — 84112 EVAL AMNIOTIC FLUID PROTEIN: CPT

## 2019-01-04 PROCEDURE — 81001 URINALYSIS AUTO W/SCOPE: CPT

## 2019-01-04 PROCEDURE — 4A1HXCZ MONITORING OF PRODUCTS OF CONCEPTION, CARDIAC RATE, EXTERNAL APPROACH: ICD-10-PCS | Performed by: OBSTETRICS & GYNECOLOGY

## 2019-01-04 PROCEDURE — 80307 DRUG TEST PRSMV CHEM ANLYZR: CPT

## 2019-01-04 NOTE — NON STRESS TEST REPORT
=================================================================

Non Stress Test

=================================================================

Datetime Report Generated by CPN: 01/04/2019 22:52

   

   

=================================================================

DEMOGRAPHIC

=================================================================

   

EGA NST:  32.0

   

=================================================================

INDICATION

=================================================================

   

Indication for Study:  Ordered by Provider; Other

Indication for Study (NST) Other:  LC

   

=================================================================

URINE RESULTS

=================================================================

   

Urine Protein, NST:  Negative

Urine Ketones - NST:  Negative

Urine Glucose - NST:  Negative

Urine Blood - NST:  Negative

   

=================================================================

MONITORING

=================================================================

   

Monitor Explained:  Monitor Explained; Test Explained; Patient

   Verbalized Understanding

Time on Monitor:  01/04/2019 22:01

Time off Monitor:  01/04/2019 22:46

NST Duration:  45

   

=================================================================

NST INTERVENTIONS

=================================================================

   

NST Interventions:  PO Hydration

Physician Notified NST:  Dr. Ramirez

BABY A:  [id number]

   

=================================================================

BABY A

=================================================================

   

Fetal Movement :  Present

Contraction Frequency :  x2

FHR Baseline :  135

Accelerations :  15X15

Decelerations :  None

Variability :  Moderate 6-25bpm

NST Review:  Meets Criteria for Reactive NST

NST Review and Verified By :  RAMBO Macario RN

NST Results:  Reactive

   

=================================================================

NST REPORT

=================================================================

   

Report Trigger:  Send Report

## 2019-01-21 ENCOUNTER — HOSPITAL ENCOUNTER (OUTPATIENT)
Dept: HOSPITAL 62 - LC | Age: 24
Discharge: HOME | End: 2019-01-21
Attending: OBSTETRICS & GYNECOLOGY
Payer: OTHER GOVERNMENT

## 2019-01-21 DIAGNOSIS — Z3A.34: ICD-10-CM

## 2019-01-21 DIAGNOSIS — O47.03: Primary | ICD-10-CM

## 2019-01-21 LAB
APPEARANCE UR: (no result)
APTT PPP: YELLOW S
BARBITURATES UR QL SCN: NEGATIVE
BILIRUB UR QL STRIP: NEGATIVE
GLUCOSE UR STRIP-MCNC: NEGATIVE MG/DL
KETONES UR STRIP-MCNC: NEGATIVE MG/DL
METHADONE UR QL SCN: NEGATIVE
NITRITE UR QL STRIP: NEGATIVE
PCP UR QL SCN: NEGATIVE
PH UR STRIP: 7 [PH] (ref 5–9)
PROT UR STRIP-MCNC: NEGATIVE MG/DL
SP GR UR STRIP: 1.01
URINE AMPHETAMINES SCREEN: NEGATIVE
URINE BENZODIAZEPINES SCREEN: NEGATIVE
URINE COCAINE SCREEN: NEGATIVE
URINE MARIJUANA (THC) SCREEN: NEGATIVE
UROBILINOGEN UR-MCNC: 2 MG/DL (ref ?–2)

## 2019-01-21 PROCEDURE — 4A1HXCZ MONITORING OF PRODUCTS OF CONCEPTION, CARDIAC RATE, EXTERNAL APPROACH: ICD-10-PCS | Performed by: OBSTETRICS & GYNECOLOGY

## 2019-01-21 PROCEDURE — 59025 FETAL NON-STRESS TEST: CPT

## 2019-01-21 PROCEDURE — 80307 DRUG TEST PRSMV CHEM ANLYZR: CPT

## 2019-01-21 PROCEDURE — 81001 URINALYSIS AUTO W/SCOPE: CPT

## 2019-01-21 NOTE — NON STRESS TEST REPORT
=================================================================

Non Stress Test

=================================================================

Datetime Report Generated by CPN: 01/21/2019 19:48

   

   

=================================================================

DEMOGRAPHIC

=================================================================

   

Test Number:  2

EGA NST:  34.3

   

=================================================================

INDICATION

=================================================================

   

Indication for Study:  Ordered by Provider

   

=================================================================

URINE RESULTS

=================================================================

   

Urine Protein, NST:  Negative

Urine Ketones - NST:  Negative

Urine Glucose - NST:  Negative

Urine Blood - NST:  Negative

   

=================================================================

MONITORING

=================================================================

   

Monitor Explained:  Monitor Explained; Test Explained; Patient

   Verbalized Understanding

Time on Monitor:  01/21/2019 18:48

Time off Monitor:  01/21/2019 19:22

NST Duration:  34

   

=================================================================

NST INTERVENTIONS

=================================================================

   

NST Interventions:  PO Hydration; Reposition Patient

Physician Notified NST:  Dr. Younger

BABY A:  [id number]

   

=================================================================

BABY A

=================================================================

   

Fetal Movement :  Present

Contraction Frequency :  7-10

FHR Baseline :  135

Decelerations :  None

Variability :  Moderate 6-25bpm

NST Review:  Meets Criteria for Reactive NST

NST Review and Verified By :  JAN Molina

NST Results:  Reactive

   

=================================================================

NST REPORT

=================================================================

   

Report Trigger:  Send Report

## 2019-01-31 ENCOUNTER — HOSPITAL ENCOUNTER (OUTPATIENT)
Dept: HOSPITAL 62 - LC | Age: 24
Discharge: HOME | End: 2019-01-31
Attending: OBSTETRICS & GYNECOLOGY
Payer: MEDICAID

## 2019-01-31 DIAGNOSIS — O47.03: Primary | ICD-10-CM

## 2019-01-31 DIAGNOSIS — Z3A.35: ICD-10-CM

## 2019-01-31 LAB
APPEARANCE UR: (no result)
APTT PPP: YELLOW S
BARBITURATES UR QL SCN: NEGATIVE
BILIRUB UR QL STRIP: NEGATIVE
GLUCOSE UR STRIP-MCNC: NEGATIVE MG/DL
KETONES UR STRIP-MCNC: NEGATIVE MG/DL
METHADONE UR QL SCN: NEGATIVE
NITRITE UR QL STRIP: NEGATIVE
PCP UR QL SCN: NEGATIVE
PH UR STRIP: 7 [PH] (ref 5–9)
PROT UR STRIP-MCNC: NEGATIVE MG/DL
SP GR UR STRIP: 1.02
URINE AMPHETAMINES SCREEN: NEGATIVE
URINE BENZODIAZEPINES SCREEN: NEGATIVE
URINE COCAINE SCREEN: NEGATIVE
URINE MARIJUANA (THC) SCREEN: NEGATIVE
UROBILINOGEN UR-MCNC: NEGATIVE MG/DL (ref ?–2)

## 2019-01-31 PROCEDURE — 81001 URINALYSIS AUTO W/SCOPE: CPT

## 2019-01-31 PROCEDURE — 59025 FETAL NON-STRESS TEST: CPT

## 2019-01-31 PROCEDURE — 87086 URINE CULTURE/COLONY COUNT: CPT

## 2019-01-31 PROCEDURE — 4A1HXCZ MONITORING OF PRODUCTS OF CONCEPTION, CARDIAC RATE, EXTERNAL APPROACH: ICD-10-PCS | Performed by: OBSTETRICS & GYNECOLOGY

## 2019-01-31 PROCEDURE — 80307 DRUG TEST PRSMV CHEM ANLYZR: CPT

## 2019-01-31 NOTE — NON STRESS TEST REPORT
=================================================================

Non Stress Test

=================================================================

Datetime Report Generated by CPN: 01/31/2019 17:35

   

   

=================================================================

DEMOGRAPHIC

=================================================================

   

EGA NST:  35.6

   

=================================================================

INDICATION

=================================================================

   

Indication for Study:  Ordered by Provider

   

=================================================================

MONITORING

=================================================================

   

Monitor Explained:  Monitor Explained; Test Explained; Patient

   Verbalized Understanding

Time on Monitor:  01/31/2019 12:58

Time off Monitor:  01/31/2019 17:10

NST Duration:  252

   

=================================================================

NST INTERVENTIONS

=================================================================

   

NST Interventions:  PO Hydration; Reposition Patient

Physician Notified NST:  Maria M, CNMINH

BABY A:  [id number]

   

=================================================================

BABY A

=================================================================

   

Fetal Movement :  Present

Contraction Frequency :  2.5-18.5

FHR Baseline :  135

Accelerations :  15X15

Decelerations :  None

Variability :  Moderate 6-25bpm

NST Review:  Meets Criteria for Reactive NST

NST Review and Verified By :  SARTHKA Bello RN

NST Results:  Reactive

   

=================================================================

NST REPORT

=================================================================

   

Report Trigger:  Send Report

## 2019-02-05 ENCOUNTER — HOSPITAL ENCOUNTER (OUTPATIENT)
Dept: HOSPITAL 62 - LC | Age: 24
Discharge: HOME | End: 2019-02-05
Attending: OBSTETRICS & GYNECOLOGY
Payer: OTHER GOVERNMENT

## 2019-02-05 DIAGNOSIS — O47.03: Primary | ICD-10-CM

## 2019-02-05 DIAGNOSIS — Z3A.36: ICD-10-CM

## 2019-02-05 LAB
APPEARANCE UR: CLEAR
APTT PPP: YELLOW S
BACTERIA (WET MOUNT): (no result)
BARBITURATES UR QL SCN: NEGATIVE
BILIRUB UR QL STRIP: NEGATIVE
CHLAM PCR: NOT DETECTED
EPITHELIALS (WET MOUNT): (no result)
GLUCOSE UR STRIP-MCNC: NEGATIVE MG/DL
GON PCR: NOT DETECTED
KETONES UR STRIP-MCNC: NEGATIVE MG/DL
METHADONE UR QL SCN: NEGATIVE
NITRITE UR QL STRIP: NEGATIVE
PCP UR QL SCN: NEGATIVE
PH UR STRIP: 7 [PH] (ref 5–9)
PROT UR STRIP-MCNC: NEGATIVE MG/DL
RBCS (WET MOUNT): (no result)
SP GR UR STRIP: 1.02
T.VAGINALIS (WET MOUNT): (no result)
URINE AMPHETAMINES SCREEN: NEGATIVE
URINE BENZODIAZEPINES SCREEN: NEGATIVE
URINE COCAINE SCREEN: NEGATIVE
URINE MARIJUANA (THC) SCREEN: NEGATIVE
UROBILINOGEN UR-MCNC: 2 MG/DL (ref ?–2)
WBCS (WET MOUNT): (no result)
YEAST (WET MOUNT): (no result)

## 2019-02-05 PROCEDURE — 59025 FETAL NON-STRESS TEST: CPT

## 2019-02-05 PROCEDURE — 81005 URINALYSIS: CPT

## 2019-02-05 PROCEDURE — 84112 EVAL AMNIOTIC FLUID PROTEIN: CPT

## 2019-02-05 PROCEDURE — 80307 DRUG TEST PRSMV CHEM ANLYZR: CPT

## 2019-02-05 PROCEDURE — 4A1HXCZ MONITORING OF PRODUCTS OF CONCEPTION, CARDIAC RATE, EXTERNAL APPROACH: ICD-10-PCS | Performed by: OBSTETRICS & GYNECOLOGY

## 2019-02-05 PROCEDURE — 87210 SMEAR WET MOUNT SALINE/INK: CPT

## 2019-02-05 PROCEDURE — 87591 N.GONORRHOEAE DNA AMP PROB: CPT

## 2019-02-05 PROCEDURE — 87491 CHLMYD TRACH DNA AMP PROBE: CPT

## 2019-02-05 NOTE — NON STRESS TEST REPORT
=================================================================

Non Stress Test

=================================================================

Datetime Report Generated by CPN: 02/05/2019 15:05

   

   

=================================================================

DEMOGRAPHIC

=================================================================

   

EGA NST:  36.4

   

=================================================================

INDICATION

=================================================================

   

Indication for Study:  Ordered by Provider

   

=================================================================

MONITORING

=================================================================

   

Monitor Explained:  Monitor Explained; Test Explained; Patient

   Verbalized Understanding

Time on Monitor:  02/05/2019 14:00

Time off Monitor:  02/05/2019 15:05

NST Duration:  65

   

=================================================================

NST INTERVENTIONS

=================================================================

   

NST Interventions:  PO Hydration; Reposition Patient

Physician Notified NST:  K Santo CNM

BABY A:  [id number]

   

=================================================================

BABY A

=================================================================

   

Fetal Movement :  Present

Contraction Frequency :  3-5

FHR Baseline :  135

Accelerations :  15X15

Decelerations :  None

Variability :  Moderate 6-25bpm

NST Review:  Meets Criteria for Reactive NST

NST Review and Verified By :  GENEVA Hoyt Results:  Reactive

   

=================================================================

NST REPORT

=================================================================

   

Report Trigger:  Send Report

## 2019-02-08 ENCOUNTER — HOSPITAL ENCOUNTER (OUTPATIENT)
Dept: HOSPITAL 62 - LC | Age: 24
Discharge: HOME | End: 2019-02-08
Attending: OBSTETRICS & GYNECOLOGY
Payer: MEDICAID

## 2019-02-08 DIAGNOSIS — O99.89: Primary | ICD-10-CM

## 2019-02-08 DIAGNOSIS — M54.9: ICD-10-CM

## 2019-02-08 DIAGNOSIS — Z3A.37: ICD-10-CM

## 2019-02-08 LAB
APPEARANCE UR: (no result)
APTT PPP: YELLOW S
BARBITURATES UR QL SCN: NEGATIVE
BILIRUB UR QL STRIP: NEGATIVE
GLUCOSE UR STRIP-MCNC: NEGATIVE MG/DL
KETONES UR STRIP-MCNC: NEGATIVE MG/DL
METHADONE UR QL SCN: NEGATIVE
NITRITE UR QL STRIP: NEGATIVE
PCP UR QL SCN: NEGATIVE
PH UR STRIP: 7 [PH] (ref 5–9)
PROT UR STRIP-MCNC: NEGATIVE MG/DL
SP GR UR STRIP: 1.01
URINE AMPHETAMINES SCREEN: NEGATIVE
URINE BENZODIAZEPINES SCREEN: NEGATIVE
URINE COCAINE SCREEN: NEGATIVE
URINE MARIJUANA (THC) SCREEN: NEGATIVE
UROBILINOGEN UR-MCNC: NEGATIVE MG/DL (ref ?–2)

## 2019-02-08 PROCEDURE — 59025 FETAL NON-STRESS TEST: CPT

## 2019-02-08 PROCEDURE — 4A1HXCZ MONITORING OF PRODUCTS OF CONCEPTION, CARDIAC RATE, EXTERNAL APPROACH: ICD-10-PCS | Performed by: OBSTETRICS & GYNECOLOGY

## 2019-02-08 PROCEDURE — 80307 DRUG TEST PRSMV CHEM ANLYZR: CPT

## 2019-02-08 PROCEDURE — 81001 URINALYSIS AUTO W/SCOPE: CPT

## 2019-02-09 NOTE — NON STRESS TEST REPORT
=================================================================

Non Stress Test

=================================================================

Datetime Report Generated by CPN: 02/09/2019 00:51

   

   

=================================================================

DEMOGRAPHIC

=================================================================

   

Test Number:  5

EGA NST:  37.0

   

=================================================================

INDICATION

=================================================================

   

Indication for Study:  Other

Indication for Study (NST) Other:  Labor check

   

=================================================================

VITAL SIGNS

=================================================================

   

Temperature - NST:  97.4

Pulse - NST:  81

RESP - NST:  16

NBPSYS NST:  92

NBPDIA NST:  54

   

=================================================================

URINE RESULTS

=================================================================

   

Urine Protein, NST:  Negative

Urine Ketones - NST:  Negative

Urine Glucose - NST:  Negative

Urine Blood - NST:  Negative

   

=================================================================

MONITORING

=================================================================

   

Monitor Explained:  Monitor Explained; Test Explained; Patient

   Verbalized Understanding

Time on Monitor:  02/08/2019 19:39

Time off Monitor:  02/08/2019 20:21

NST Duration:  42

   

=================================================================

NST INTERVENTIONS

=================================================================

   

NST Interventions:  PO Hydration

Physician Notified NST:  Dr. Younger

BABY A:  [id number]

   

=================================================================

BABY A

=================================================================

   

Fetal Movement :  Present

Contraction Frequency :  3-6

FHR Baseline :  145

Accelerations :  15X15

Decelerations :  None

Variability :  Moderate 6-25bpm

Variability :  Moderate 6-25bpm

NST Review:  Meets Criteria for Reactive NST

NST Review and Verified By :  ALBERT Crawley RN

NST Results:  Reactive

   

=================================================================

NST REPORT

=================================================================

   

Report Trigger:  Send Report

## 2019-02-12 ENCOUNTER — HOSPITAL ENCOUNTER (OUTPATIENT)
Dept: HOSPITAL 62 - LC | Age: 24
LOS: 1 days | Discharge: HOME | End: 2019-02-13
Attending: OBSTETRICS & GYNECOLOGY
Payer: MEDICAID

## 2019-02-12 DIAGNOSIS — O47.1: Primary | ICD-10-CM

## 2019-02-12 LAB
APPEARANCE UR: CLEAR
APTT PPP: (no result) S
BILIRUB UR QL STRIP: NEGATIVE
GLUCOSE UR STRIP-MCNC: NEGATIVE MG/DL
KETONES UR STRIP-MCNC: NEGATIVE MG/DL
NITRITE UR QL STRIP: NEGATIVE
PH UR STRIP: 8 [PH] (ref 5–9)
PROT UR STRIP-MCNC: NEGATIVE MG/DL
SP GR UR STRIP: 1
UROBILINOGEN UR-MCNC: NEGATIVE MG/DL (ref ?–2)

## 2019-02-12 PROCEDURE — 59025 FETAL NON-STRESS TEST: CPT

## 2019-02-12 PROCEDURE — 80307 DRUG TEST PRSMV CHEM ANLYZR: CPT

## 2019-02-12 PROCEDURE — 81005 URINALYSIS: CPT

## 2019-02-12 PROCEDURE — 84112 EVAL AMNIOTIC FLUID PROTEIN: CPT

## 2019-02-13 LAB
BARBITURATES UR QL SCN: NEGATIVE
METHADONE UR QL SCN: NEGATIVE
PCP UR QL SCN: NEGATIVE
URINE AMPHETAMINES SCREEN: NEGATIVE
URINE BENZODIAZEPINES SCREEN: NEGATIVE
URINE COCAINE SCREEN: NEGATIVE
URINE MARIJUANA (THC) SCREEN: NEGATIVE

## 2019-02-13 NOTE — NON STRESS TEST REPORT
=================================================================

Non Stress Test

=================================================================

Datetime Report Generated by CPN: 02/13/2019 00:46

   

   

=================================================================

DEMOGRAPHIC

=================================================================

   

Test Number:  6

EGA NST:  37.4

   

=================================================================

INDICATION

=================================================================

   

Indication for Study:  Other

Indication for Study (NST) Other:  Labor check

   

=================================================================

VITAL SIGNS

=================================================================

   

Temperature - NST:  98.1

Pulse - NST:  77

RESP - NST:  18

NBPSYS NST:  97

NBPDIA NST:  57

   

=================================================================

URINE RESULTS

=================================================================

   

Urine Protein, NST:  Negative

Urine Ketones - NST:  Negative

Urine Glucose - NST:  Negative

Urine Blood - NST:  Negative

   

=================================================================

MONITORING

=================================================================

   

Monitor Explained:  Monitor Explained; Test Explained; Patient

   Verbalized Understanding

Time on Monitor:  02/12/2019 23:24

Time off Monitor:  02/13/2019 00:01

NST Duration:  37

   

=================================================================

NST INTERVENTIONS

=================================================================

   

NST Interventions:  PO Hydration

Physician Notified NST:  Dr. Andrew

BABY A:  [id number]

   

=================================================================

BABY A

=================================================================

   

Fetal Movement :  Present

Contraction Frequency :  2.5-5

FHR Baseline :  135

Accelerations :  15X15

Decelerations :  None

Variability :  Moderate 6-25bpm

NST Review:  Meets Criteria for Reactive NST

NST Review and Verified By :  NDoyle RN

NST Results:  Reactive

   

=================================================================

NST REPORT

=================================================================

   

Report Trigger:  Send Report

## 2019-02-14 ENCOUNTER — HOSPITAL ENCOUNTER (OUTPATIENT)
Dept: HOSPITAL 62 - LC | Age: 24
Discharge: HOME | End: 2019-02-14
Attending: OBSTETRICS & GYNECOLOGY
Payer: MEDICAID

## 2019-02-14 DIAGNOSIS — O47.1: Primary | ICD-10-CM

## 2019-02-14 DIAGNOSIS — Z3A.37: ICD-10-CM

## 2019-02-14 LAB
APPEARANCE UR: CLEAR
APTT PPP: YELLOW S
BARBITURATES UR QL SCN: NEGATIVE
BILIRUB UR QL STRIP: NEGATIVE
GLUCOSE UR STRIP-MCNC: NEGATIVE MG/DL
KETONES UR STRIP-MCNC: NEGATIVE MG/DL
METHADONE UR QL SCN: NEGATIVE
NITRITE UR QL STRIP: NEGATIVE
PCP UR QL SCN: NEGATIVE
PH UR STRIP: 7 [PH] (ref 5–9)
PROT UR STRIP-MCNC: NEGATIVE MG/DL
SP GR UR STRIP: 1.01
URINE AMPHETAMINES SCREEN: NEGATIVE
URINE BENZODIAZEPINES SCREEN: NEGATIVE
URINE COCAINE SCREEN: NEGATIVE
URINE MARIJUANA (THC) SCREEN: NEGATIVE
UROBILINOGEN UR-MCNC: NEGATIVE MG/DL (ref ?–2)

## 2019-02-14 PROCEDURE — 4A1HXCZ MONITORING OF PRODUCTS OF CONCEPTION, CARDIAC RATE, EXTERNAL APPROACH: ICD-10-PCS | Performed by: OBSTETRICS & GYNECOLOGY

## 2019-02-14 PROCEDURE — 81005 URINALYSIS: CPT

## 2019-02-14 PROCEDURE — 80307 DRUG TEST PRSMV CHEM ANLYZR: CPT

## 2019-02-14 NOTE — NON STRESS TEST REPORT
=================================================================

Non Stress Test

=================================================================

Datetime Report Generated by CPN: 02/14/2019 23:54

   

   

=================================================================

DEMOGRAPHIC

=================================================================

   

Test Number:  7

EGA NST:  37.6

   

=================================================================

INDICATION

=================================================================

   

Indication for Study:  Ordered by Provider

   

=================================================================

MONITORING

=================================================================

   

Monitor Explained:  Monitor Explained; Test Explained; Patient

   Verbalized Understanding

Time on Monitor:  02/14/2019 22:54

Time off Monitor:  02/14/2019 23:46

NST Duration:  52

   

=================================================================

NST INTERVENTIONS

=================================================================

   

NST Interventions:  PO Hydration; Reposition Patient

Physician Notified NST:  Dr. Younger

BABY A:  [id number]

   

=================================================================

BABY A

=================================================================

   

Fetal Movement :  Present

Contraction Frequency :  occasional

FHR Baseline :  125

Accelerations :  15X15

Decelerations :  None

Variability :  Moderate 6-25bpm

NST Review:  Meets Criteria for Reactive NST

NST Review and Verified By :  SVance, RNC

NST Results:  Reactive

   

=================================================================

NST REPORT

=================================================================

   

Report Trigger:  Send Report

## 2019-02-17 ENCOUNTER — HOSPITAL ENCOUNTER (OUTPATIENT)
Dept: HOSPITAL 62 - LC | Age: 24
Discharge: HOME | End: 2019-02-17
Attending: OBSTETRICS & GYNECOLOGY
Payer: MEDICAID

## 2019-02-17 DIAGNOSIS — Z3A.38: ICD-10-CM

## 2019-02-17 DIAGNOSIS — O47.1: Primary | ICD-10-CM

## 2019-02-17 PROCEDURE — 4A1HXCZ MONITORING OF PRODUCTS OF CONCEPTION, CARDIAC RATE, EXTERNAL APPROACH: ICD-10-PCS | Performed by: OBSTETRICS & GYNECOLOGY

## 2019-02-17 PROCEDURE — 59025 FETAL NON-STRESS TEST: CPT

## 2019-02-17 PROCEDURE — 84112 EVAL AMNIOTIC FLUID PROTEIN: CPT

## 2019-02-17 PROCEDURE — 80307 DRUG TEST PRSMV CHEM ANLYZR: CPT

## 2019-02-17 PROCEDURE — 81005 URINALYSIS: CPT

## 2019-02-17 NOTE — NON STRESS TEST REPORT
=================================================================

Non Stress Test

=================================================================

Datetime Report Generated by CPN: 02/17/2019 17:08

   

   

=================================================================

DEMOGRAPHIC

=================================================================

   

EGA NST:  38.2

   

=================================================================

INDICATION

=================================================================

   

Indication for Study:  Ordered by Provider

Indication for Study (NST) Other:  Labor Check

   

=================================================================

MONITORING

=================================================================

   

Monitor Explained:  Monitor Explained; Test Explained; Patient

   Verbalized Understanding

Time on Monitor:  02/17/2019 15:46

Time off Monitor:  02/17/2019 17:02

NST Duration:  76

   

=================================================================

NST INTERVENTIONS

=================================================================

   

NST Interventions:  PO Hydration; Reposition Patient

Physician Notified NST:  Dr. Ramirez

BABY A:  [id number]

   

=================================================================

BABY A

=================================================================

   

Fetal Movement :  Present

Contraction Frequency :  2-5

FHR Baseline :  140

Accelerations :  15X15

Variability :  Moderate 6-25bpm

NST Review:  Meets Criteria for Reactive NST

NST Review and Verified By :  B Baidy RN

NST Results:  Reactive

   

=================================================================

NST REPORT

=================================================================

   

Report Trigger:  Send Report

## 2019-02-20 ENCOUNTER — HOSPITAL ENCOUNTER (OUTPATIENT)
Dept: HOSPITAL 62 - LC | Age: 24
Discharge: HOME | End: 2019-02-20
Attending: OBSTETRICS & GYNECOLOGY
Payer: MEDICAID

## 2019-02-20 DIAGNOSIS — Z3A.38: ICD-10-CM

## 2019-02-20 DIAGNOSIS — O47.1: Primary | ICD-10-CM

## 2019-02-20 PROCEDURE — 81005 URINALYSIS: CPT

## 2019-02-20 PROCEDURE — 4A1HXCZ MONITORING OF PRODUCTS OF CONCEPTION, CARDIAC RATE, EXTERNAL APPROACH: ICD-10-PCS | Performed by: OBSTETRICS & GYNECOLOGY

## 2019-02-20 PROCEDURE — 80307 DRUG TEST PRSMV CHEM ANLYZR: CPT

## 2019-02-20 PROCEDURE — 59025 FETAL NON-STRESS TEST: CPT

## 2019-02-20 NOTE — NON STRESS TEST REPORT
=================================================================

Non Stress Test

=================================================================

Datetime Report Generated by CPN: 02/20/2019 02:17

   

   

=================================================================

DEMOGRAPHIC

=================================================================

   

Test Number:  5

EGA NST:  38.5

   

=================================================================

INDICATION

=================================================================

   

Indication for Study:  Ordered by Provider

   

=================================================================

MONITORING

=================================================================

   

Monitor Explained:  Monitor Explained; Test Explained; Patient

   Verbalized Understanding

Time on Monitor:  02/20/2019 01:32

Time off Monitor:  02/20/2019 02:13

NST Duration:  41

   

=================================================================

NST INTERVENTIONS

=================================================================

   

NST Interventions:  PO Hydration

Physician Notified NST:  Dr Younger

BABY A:  [id number]

   

=================================================================

BABY A

=================================================================

   

Fetal Movement :  Present

Contraction Frequency :  occasional

FHR Baseline :  135

Accelerations :  15X15

Decelerations :  None

Variability :  Moderate 6-25bpm

NST Review:  Meets Criteria for Reactive NST

NST Review and Verified By :  JAN Molina

NST Results:  Reactive

   

=================================================================

NST REPORT

=================================================================

   

Report Trigger:  Send Report

## 2019-02-22 ENCOUNTER — HOSPITAL ENCOUNTER (INPATIENT)
Dept: HOSPITAL 62 - LC | Age: 24
LOS: 2 days | Discharge: HOME | End: 2019-02-24
Attending: OBSTETRICS & GYNECOLOGY | Admitting: OBSTETRICS & GYNECOLOGY
Payer: MEDICAID

## 2019-02-22 ENCOUNTER — HOSPITAL ENCOUNTER (OUTPATIENT)
Dept: HOSPITAL 62 - LC | Age: 24
Discharge: HOME | End: 2019-02-22
Attending: OBSTETRICS & GYNECOLOGY
Payer: MEDICAID

## 2019-02-22 DIAGNOSIS — Z3A.39: ICD-10-CM

## 2019-02-22 DIAGNOSIS — Z34.93: Primary | ICD-10-CM

## 2019-02-22 DIAGNOSIS — O32.6XX0: ICD-10-CM

## 2019-02-22 LAB
APPEARANCE UR: (no result)
APTT PPP: (no result) S
BARBITURATES UR QL SCN: NEGATIVE
BARBITURATES UR QL SCN: NEGATIVE
BILIRUB UR QL STRIP: NEGATIVE
ERYTHROCYTE [DISTWIDTH] IN BLOOD BY AUTOMATED COUNT: 14.5 % (ref 11.5–14)
GLUCOSE UR STRIP-MCNC: NEGATIVE MG/DL
HCT VFR BLD CALC: 34.4 % (ref 36–47)
HGB BLD-MCNC: 11.9 G/DL (ref 12–15.5)
KETONES UR STRIP-MCNC: NEGATIVE MG/DL
MCH RBC QN AUTO: 31.3 PG (ref 27–33.4)
MCHC RBC AUTO-ENTMCNC: 34.7 G/DL (ref 32–36)
MCV RBC AUTO: 90 FL (ref 80–97)
METHADONE UR QL SCN: NEGATIVE
METHADONE UR QL SCN: NEGATIVE
NITRITE UR QL STRIP: NEGATIVE
PCP UR QL SCN: NEGATIVE
PCP UR QL SCN: NEGATIVE
PH UR STRIP: 7 [PH] (ref 5–9)
PLATELET # BLD: 137 10^3/UL (ref 150–450)
PROT UR STRIP-MCNC: NEGATIVE MG/DL
RBC # BLD AUTO: 3.82 10^6/UL (ref 3.72–5.28)
SP GR UR STRIP: 1
URINE AMPHETAMINES SCREEN: NEGATIVE
URINE AMPHETAMINES SCREEN: NEGATIVE
URINE BENZODIAZEPINES SCREEN: NEGATIVE
URINE BENZODIAZEPINES SCREEN: NEGATIVE
URINE COCAINE SCREEN: NEGATIVE
URINE COCAINE SCREEN: NEGATIVE
URINE MARIJUANA (THC) SCREEN: NEGATIVE
URINE MARIJUANA (THC) SCREEN: NEGATIVE
UROBILINOGEN UR-MCNC: NEGATIVE MG/DL (ref ?–2)
WBC # BLD AUTO: 15.1 10^3/UL (ref 4–10.5)

## 2019-02-22 PROCEDURE — 86592 SYPHILIS TEST NON-TREP QUAL: CPT

## 2019-02-22 PROCEDURE — 36415 COLL VENOUS BLD VENIPUNCTURE: CPT

## 2019-02-22 PROCEDURE — 59025 FETAL NON-STRESS TEST: CPT

## 2019-02-22 PROCEDURE — 86850 RBC ANTIBODY SCREEN: CPT

## 2019-02-22 PROCEDURE — 86901 BLOOD TYPING SEROLOGIC RH(D): CPT

## 2019-02-22 PROCEDURE — 80307 DRUG TEST PRSMV CHEM ANLYZR: CPT

## 2019-02-22 PROCEDURE — 86900 BLOOD TYPING SEROLOGIC ABO: CPT

## 2019-02-22 PROCEDURE — 4A1HXCZ MONITORING OF PRODUCTS OF CONCEPTION, CARDIAC RATE, EXTERNAL APPROACH: ICD-10-PCS | Performed by: OBSTETRICS & GYNECOLOGY

## 2019-02-22 PROCEDURE — 81005 URINALYSIS: CPT

## 2019-02-22 PROCEDURE — 0HQ9XZZ REPAIR PERINEUM SKIN, EXTERNAL APPROACH: ICD-10-PCS | Performed by: OBSTETRICS & GYNECOLOGY

## 2019-02-22 PROCEDURE — 85027 COMPLETE CBC AUTOMATED: CPT

## 2019-02-22 RX ADMIN — ACETAMINOPHEN AND CODEINE PHOSPHATE PRN EACH: 300; 30 TABLET ORAL at 20:08

## 2019-02-22 RX ADMIN — ACETAMINOPHEN AND CODEINE PHOSPHATE PRN EACH: 300; 30 TABLET ORAL at 15:27

## 2019-02-22 RX ADMIN — DOCUSATE SODIUM SCH MG: 100 CAPSULE, LIQUID FILLED ORAL at 18:55

## 2019-02-22 RX ADMIN — FERROUS SULFATE TAB 325 MG (65 MG ELEMENTAL FE) SCH MG: 325 (65 FE) TAB at 18:55

## 2019-02-22 RX ADMIN — IBUPROFEN SCH MG: 800 TABLET, FILM COATED ORAL at 22:47

## 2019-02-22 RX ADMIN — IBUPROFEN SCH MG: 800 TABLET, FILM COATED ORAL at 15:26

## 2019-02-22 RX ADMIN — FAMOTIDINE SCH MG: 20 TABLET, FILM COATED ORAL at 22:47

## 2019-02-22 NOTE — ADMISSION PHYSICAL
=================================================================



=================================================================

Datetime Report Generated by CPN: 2019 14:51

   

   

=================================================================

CURRENT ADMISSION

=================================================================

   

Chief Complaint:  Uterine Contractions

Indication for Induction:  Not Applicable

Admit Impression :  Active Labor

Admit Plan:  Admit to Unit; Initiate Labor Protocol

Admit Plan- Other:  GBS neg

   

=================================================================

ALLERGIES

=================================================================

   

Medication Allergies:  No

Medication Allergies:  No Known Allergies (2019)

Latex:  No Latex Allergies

Food Allergies:  no

Environmental Allergies:  no

   

=================================================================

OBSTETRICAL HISTORY

=================================================================

   

EDC:  2019 00:00

:  3

Para:  1

Term:  0

:  1

SAB:  1

Ectopic:  0

Livin

Cesareans:  0

VBACs:  0

Multiple Births:  0

Gestational Diabetes:  No

Rh Sensitization:  No

Incompetent Cervix:  No

LIANE:  No

Infertility:  No

ART Treatment:  No

Uterine Anomaly:  No

IUGR:  No

Hx Previous C/S:  No

Macrosomia:  No

Hx Loss/Stillborn:  No

PIH:  No

Hx  Death:  No

Placenta Previa/Abruption:  No

Depression/PP Depression:  No

PTL/PROM:  Yes

Post Partum Hemorrhage:  No

Current Pregnancy Procedures:  Ultrasound; NST

Obstetrical History Comments:  g1 2015 35 weeks induced, high

   risk (unsure why), NRFHT seen in office MD told her her water broke

   2018 8 weeks SAB

   g3 - current pregnancy

   

=================================================================

***SEE PRENATAL RECORDS***

=================================================================

   

Alcohol:  No

Marijuana :  No

Cocaine:  No

Other Illicit Drugs:  No

Cigarettes:  Never Smoker. [id number]

   

=================================================================

MEDICAL HISTORY

=================================================================

   

Diabetes:  No

Blood Transfusion:  No

Pulmonary Disease (Asthma, TB):  No

Breast Disease:  No

Hypertension:  No

Gyn Surgery:  No

Heart Disease:  No

Hosp/Surgery:  Yes

Autoimmune Disorder:  No

Anesthetic Complications:  No

Kidney Disease:  No

Abnormal Pap Smear:  Yes

Neuro/Epilepsy:  No

Psychiatric Disorders:  No

Other Medical Diseases:  No

Hepatitis/Liver Disease:  No

Significant Family History:  No

Varicosities/Phlebitis:  No

Trauma/Violence :  No

Thyroid Dysfunction:  No

Medical History Comments:  LGSIL, needs colpo postpartum, gallbladder

   removel , eye surgery age 6, 12, 15

   

=================================================================

INFECTIOUS HISTORY

=================================================================

   

Gonorrhea:  Yes

Genital Herpes:  No

Chlamydia:  No

Tuberculosis:  No

Syphilis:  No

Hepatitis:  No

HIV/AIDS Exposure:  No

Rash or Viral Illness:  No

HPV:  No

Infectious History Comments:  gonorrhea 

   

=================================================================

PHYSICAL EXAM

=================================================================

   

General:  Normal

HEENT:  Normal

Neurologic:  Normal

Thyroid:  Deferred

Heart:  Normal

Lungs:  Normal

Breast:  Deferred

Back:  Deferred

Abdomen:  Normal

Genitourinary Exam:  Normal

Extremities:  Normal

DTRs:  Deferred

Pelvic Type:  Adequate

Vital Signs:  Reviewed

   

=================================================================

VAGINAL EXAM

=================================================================

   

Dilatation:  10

Station:  3

   

=================================================================

FETUS A

=================================================================

   

EGA:  39.0

Monitoring:  External US

FHR- Baseline:  130

Variability:  Moderate 6-25bpm

Accelerations:  15X15

Decelerations:  None

Fetal Presentation:  Vertex

   

=================================================================

PLANS FOR LABOR AND DELIVERY

=================================================================

   

Labor and Delivery:  None

Pain Management:  Epidural

Feeding Preference:  Breast

Benefit of Breast Feed Discussed:  Yes

Circumcision:  No

   

=================================================================

INFORMED CONSENT

=================================================================

   

Assignment:  Prudence Morales MD

Signature:  Electronically signed by Nany Santo CNM on 2019 at

   14:50  with User ID: [email or web address]

:  Electronically signed by Nany Santo CNM on 2019 at 14:50 

   with User ID: [email or web address]

## 2019-02-22 NOTE — DELIVERY SUMMARY
=================================================================

Del Sum A-C

=================================================================

Datetime Report Generated by CPN: 2019 16:56

   

   

=================================================================

DELIVERY PERSONNEL

=================================================================

   

DELIVERY PERSONNEL:  [id number]

Delivery Doctor::  Nany Santo CNM

Labor and Delivery Nurse::  JAN So

Labor and Delivery Nurse::  Patrizia Morales RN

   

=================================================================

MATERNAL INFORMATION

=================================================================

   

Delivery Anesthesia:  None

Medications After Delivery:  Pitocin 10 Units IM

Maternal Complications:  Precipitous Labor (<3hrs)

Provider Comments:  Pt complete and wanting to push, baby at +3 station.

   Delivery OA with Lt compound hand and loop of cord under the chin.

   Vigorous, to mothers abd. Cord clamped x 2, 3vc noted. Placenta

   spont via roque.

   Pitocin given IM and bleeding stabilized.

   

=================================================================

LABOR SUMMARY

=================================================================

   

EDC:  2019 00:00

No. Babies in Womb:  1

 Attempted:  No

Labor Anesthesia:  None

   

=================================================================

LABOR INFORMATION

=================================================================

   

Reason for Induction:  Not Applicable

Onset of Labor:  2019 13:00

Complete Dilatation:  2019 14:00

Oxytocin:  N/A

Group B Beta Strep:  Negative

Antibiotics # of Doses:  0

Antibiotics Time of Last Dose:  n/a

Name of Antibiotic Given:  n/a

Steroids Given:  None

Reason Steroids Not Administered:  Not Applicable

   

=================================================================

MEMBRANES

=================================================================

   

Membranes Rupture Method:  Spontaneous

Rupture of Membranes:  2019 13:41

Length of Rupture (hr):  0.35

Amniotic Fluid Color:  Clear

Amniotic Fluid Amount:  Small

Amniotic Fluid Odor:  Normal

   

=================================================================

STAGES OF LABOR

=================================================================

   

Stage 1 hr:  1

Stage 1 min:  0

Stage 2 hr:  0

Stage 2 min:  2

Stage 3 hr:  0

Stage 3 min:  8

Total Time in Labor hr:  1

Total Time in Labor min:  10

   

=================================================================

VAGINAL DELIVERY

=================================================================

   

Episiotomy:  None

Laceration #1:  Perineal

Laceration Extension #1:  First Degree

Other Laceration:  right labial

Laceration Repair:  Yes

Laceration Repair Note:  3.0 chromic , 1 interrupted stitch 

Sponge Count Correct:  N/A

Sharps Count Correct:  Yes

   

=================================================================

CSECTION DELIVERY

=================================================================

   

Primary Indication:  N/A

Secondary Indication:  N/A

CSection Incidence:  N/A

Labor:  N/A

Elective:  N/A

CSection Incision:  N/A

   

=================================================================

BABY A INFORMATION

=================================================================

   

Infant Delivery Date/Time:  2019 14:02

Method of Delivery:  Vaginal

Born in Route :  No

:  N/A

Forceps:  N/A

Vacuum Extraction:  N/A

Shoulder Dystocia :  No

   

=================================================================

PRESENTATION/POSITION BABY A

=================================================================

   

Presentation:  Cephalic

Cephalic Presentation:  Vertex

Vertex Position:  Left Occipital Anterior

Breech Presentation:  N/A

   

=================================================================

PLACENTA INFORMATION BABY A

=================================================================

   

Placenta Delivery Time :  2019 14:10

Placenta Method of Delivery:  Spontaneous

Placenta Status:  Delivered

   

=================================================================

APGAR SCORES BABY A

=================================================================

   

Heart Rate 1 min:  >100 bpm

Resp Effort 1 min:  Good Cry

Reflex Irritability 1 min:  Cough or Sneeze or Pulls Away

Muscle Tone 1 min:  Active Motion

Color 1 min:  Body Pink, Extremities Blue

Resuscitation Effort 1 min:  Tactile Stimulation

APGAR SCORE 1 MIN:  9

Heart Rate 5 min:  >100 bpm

Resp Effort 5 min:  Good Cry

Reflex Irritability 5 min:  Cough or Sneeze or Pulls Away

Muscle Tone 5 min:  Active Motion

Color 5 min:  Body Pink, Extremities Blue

Resuscitation Effort 5 min:  N/A

APGAR SCORE 5 MIN:  9

   

=================================================================

INFANT INFORMATION BABY A

=================================================================

   

Gestational Age at Delivery:  39.0

Gestational Status:  Full Term- 39- 40.6 Weeks

Infant Outcome :  Liveborn

Infant Condition :  Stable

Infant Sex:  Male

   

=================================================================

IDENTIFICATION BABY A

=================================================================

   

Infant Verification Date/Time:  2019 15:40

ID Band Number:  [id number]

Mother's Name Verified:  Yes

Infant Medical Record Number:  [id number]

RN Verifying Infant:  D Bellavance RN/J Field RN

   

=================================================================

WEIGHT/LENGTH BABY A

=================================================================

   

Infant Length (in):  18.75

Infant Length (cm):  47.63

   

=================================================================

CORD INFORMATION BABY A

=================================================================

   

No. Cord Vessels:  3

Nuchal Cord :  N/A

Nuchal Cord- Other:  left compound hand, chin cord

Cord Blood Taken:  Yes-For Storage (Mom's Blood type +)

Infant Suction:  None

   

=================================================================

ASSESSMENT BABY A

=================================================================

   

Infant Complications:  None

Physical Findings at Delivery:  Within Normal Limits

Infant Respirations:  Appears Normal

Skin to Skin:  Yes

Neonatologist/ALS Called :  No

Infant Care By:  JOANN Lancaster RN

Transferred To:  Remains with Mother

   

=================================================================

BABY B INFORMATION

=================================================================

   

 :  N/A

   

=================================================================

SIGNATURES

=================================================================

   

Assignment:  Prudence Morales MD

Signature:  Electronically signed by Nany Santo CNM on 2019 at

   14:58  with User ID: [email or web address]

:  Electronically signed by Nany Santo CNM on 2019 at 14:58 

   with User ID: [email or web address]

:  I was personally available for consultation and serving as

   supervising physician for the MLP.

## 2019-02-22 NOTE — NON STRESS TEST REPORT
=================================================================

Non Stress Test

=================================================================

Datetime Report Generated by CPN: 02/22/2019 07:33

   

   

=================================================================

DEMOGRAPHIC

=================================================================

   

EGA NST:  39.0

   

=================================================================

INDICATION

=================================================================

   

Indication for Study:  Ordered by Provider

   

=================================================================

MONITORING

=================================================================

   

Monitor Explained:  Monitor Explained; Test Explained; Patient

   Verbalized Understanding

Time on Monitor:  02/22/2019 07:11

Time off Monitor:  02/22/2019 07:31

NST Duration:  20

   

=================================================================

NST INTERVENTIONS

=================================================================

   

NST Interventions:  Reposition Patient

Physician Notified NST:  Dr. Ramirez

BABY A:  [id number]

   

=================================================================

BABY A

=================================================================

   

Fetal Movement :  Present

Contraction Frequency :  irregular

FHR Baseline :  130

Accelerations :  15X15

Decelerations :  None

Variability :  Moderate 6-25bpm

NST Review:  Meets Criteria for Reactive NST

NST Review and Verified By :  GENEVA Alford Results:  Reactive

   

=================================================================

NST REPORT

=================================================================

   

Report Trigger:  Send Report

## 2019-02-23 LAB
ERYTHROCYTE [DISTWIDTH] IN BLOOD BY AUTOMATED COUNT: 14.7 % (ref 11.5–14)
HCT VFR BLD CALC: 29.1 % (ref 36–47)
HGB BLD-MCNC: 10.3 G/DL (ref 12–15.5)
MCH RBC QN AUTO: 31.9 PG (ref 27–33.4)
MCHC RBC AUTO-ENTMCNC: 35.3 G/DL (ref 32–36)
MCV RBC AUTO: 90 FL (ref 80–97)
PLATELET # BLD: 127 10^3/UL (ref 150–450)
RBC # BLD AUTO: 3.22 10^6/UL (ref 3.72–5.28)
WBC # BLD AUTO: 14.5 10^3/UL (ref 4–10.5)

## 2019-02-23 RX ADMIN — DOCUSATE SODIUM SCH MG: 100 CAPSULE, LIQUID FILLED ORAL at 11:24

## 2019-02-23 RX ADMIN — FERROUS SULFATE TAB 325 MG (65 MG ELEMENTAL FE) SCH MG: 325 (65 FE) TAB at 18:41

## 2019-02-23 RX ADMIN — FERROUS SULFATE TAB 325 MG (65 MG ELEMENTAL FE) SCH MG: 325 (65 FE) TAB at 11:24

## 2019-02-23 RX ADMIN — FAMOTIDINE SCH MG: 20 TABLET, FILM COATED ORAL at 21:18

## 2019-02-23 RX ADMIN — SENNOSIDES, DOCUSATE SODIUM SCH EACH: 50; 8.6 TABLET, FILM COATED ORAL at 11:24

## 2019-02-23 RX ADMIN — Medication SCH CAP: at 11:24

## 2019-02-23 RX ADMIN — DOCUSATE SODIUM SCH MG: 100 CAPSULE, LIQUID FILLED ORAL at 18:41

## 2019-02-23 RX ADMIN — ACETAMINOPHEN AND CODEINE PHOSPHATE PRN EACH: 300; 30 TABLET ORAL at 18:41

## 2019-02-23 RX ADMIN — IBUPROFEN SCH MG: 800 TABLET, FILM COATED ORAL at 21:18

## 2019-02-23 RX ADMIN — FAMOTIDINE SCH MG: 20 TABLET, FILM COATED ORAL at 11:24

## 2019-02-23 RX ADMIN — IBUPROFEN SCH MG: 800 TABLET, FILM COATED ORAL at 05:18

## 2019-02-23 RX ADMIN — IBUPROFEN SCH MG: 800 TABLET, FILM COATED ORAL at 14:36

## 2019-02-23 NOTE — PDOC PROGRESS REPORT
Subjective-OB


Progress Note for:: 02/23/19


Subjective: 


Pt doing well.  No concerns. Reports light bleeding, reg diet and voiding 

without difficulty. 








Physical Exam (OB)


Vital Signs: 


                                        











Temp Pulse Resp BP Pulse Ox


 


 98.1 F   68   16   115/62   98 


 


 02/23/19 07:30  02/23/19 07:30  02/23/19 07:30  02/23/19 07:30  02/23/19 07:30








                                 Intake & Output











 02/22/19 02/23/19 02/24/19





 06:59 06:59 06:59


 


Intake Total  400 


 


Balance  400 


 


Weight  60.9 kg 














- PIH/Pre-Eclampsia


DTR's: 2 +


Clonus: Negative


Headache: Absent


Epigastric Pain: No


Visual Changes: No





- Lochia


Lochia Amount: Moderate 25-50 ml


Lochia Color: Rubra/Red





- Abdomen


Description: Soft, Round


Hernia Present: No


Fundal Description: Firm


Fundal Height: u/u - u/2





Objective-Diagnostic


Laboratory: 


                                        





                                 02/23/19 07:53 





                                        











  02/22/19 02/22/19 02/23/19





  14:30 14:30 07:53


 


WBC  15.1 H   14.5 H


 


RBC  3.82   3.22 L


 


Hgb  11.9 L   10.3 L


 


Hct  34.4 L   29.1 L


 


MCV  90   90


 


MCH  31.3   31.9


 


MCHC  34.7   35.3


 


RDW  14.5 H   14.7 H


 


Plt Count  137 L   127 L


 


Blood Type   A POSITIVE 


 


Antibody Screen   NEGATIVE 














Assessment and Plan(PN)





- Assessment and Plan


(1) Vaginal delivery


Is this a current diagnosis for this admission?: Yes   





- Time Spent with Patient


Time with patient: Less than 15 minutes


Medications reviewed and adjusted accordingly: Yes





- Disposition


Anticipated Discharge: Home


Within: within 24 hours

## 2019-02-24 VITALS — DIASTOLIC BLOOD PRESSURE: 53 MMHG | SYSTOLIC BLOOD PRESSURE: 106 MMHG

## 2019-02-24 RX ADMIN — FAMOTIDINE SCH MG: 20 TABLET, FILM COATED ORAL at 10:12

## 2019-02-24 RX ADMIN — SENNOSIDES, DOCUSATE SODIUM SCH EACH: 50; 8.6 TABLET, FILM COATED ORAL at 10:12

## 2019-02-24 RX ADMIN — IBUPROFEN SCH MG: 800 TABLET, FILM COATED ORAL at 05:36

## 2019-02-24 RX ADMIN — FERROUS SULFATE TAB 325 MG (65 MG ELEMENTAL FE) SCH MG: 325 (65 FE) TAB at 10:12

## 2019-02-24 RX ADMIN — DOCUSATE SODIUM SCH MG: 100 CAPSULE, LIQUID FILLED ORAL at 10:12

## 2019-02-24 RX ADMIN — Medication SCH CAP: at 10:12

## 2019-02-24 NOTE — PDOC DISCHARGE SUMMARY
Final Diagnosis


Discharge Date: 02/24/19





- Final Diagnosis


(1) Vaginal delivery


Is this a current diagnosis for this admission?: Yes   





Discharge Data





- Discharge Medication


Prescriptions: 


Ibuprofen [Motrin 800 mg Tablet] 800 mg PO Q8HP PRN #60 tablet


 PRN Reason: 


Home Medications: 








Pnv,Calcium 72/Iron/Folic Acid [Pnv Prenatal Plus Multivit Tab] 1 tab PO DAILY 

01/04/19 


Ibuprofen [Motrin 800 mg Tablet] 800 mg PO Q8HP PRN #60 tablet 02/24/19 








Reason(s) for Admission: Onset of Labor


Prenatal Procedures: NST


Intrapartum Procedure(s): Spontaneous Vaginal Delivery


Postpartum Complication(s): Laceration-Labial


Laceration-Degree: 1st





- Diagnosis Test


Laboratory: 


                                        











Temp Pulse Resp BP Pulse Ox


 


 98.3 F   77   16   106/53 L  98 


 


 02/24/19 07:19  02/24/19 07:19  02/24/19 07:19  02/24/19 07:19  02/24/19 07:19








                                        











  02/22/19 02/22/19 02/23/19





  14:30 23:15 07:53


 


RBC  3.82   3.22 L


 


Hgb  11.9 L   10.3 L


 


Hct  34.4 L   29.1 L


 


Urine Opiates Screen   UNCONFIRMED POSITIVE 














- Discharge information/Instructions


Discharge Activity: Balance Activity w/Rest, Pelvic Rest


Discharge Diet: Regular


Disposition: HOME, SELF-CARE


Follow up with: [facility]


in: 4, Weeks